# Patient Record
Sex: FEMALE | Race: ASIAN
[De-identification: names, ages, dates, MRNs, and addresses within clinical notes are randomized per-mention and may not be internally consistent; named-entity substitution may affect disease eponyms.]

---

## 2021-06-15 ENCOUNTER — HOSPITAL ENCOUNTER (OUTPATIENT)
Dept: HOSPITAL 47 - RADUSWWP | Age: 31
Discharge: HOME | End: 2021-06-15
Attending: SURGERY
Payer: COMMERCIAL

## 2021-06-15 DIAGNOSIS — N20.0: Primary | ICD-10-CM

## 2021-06-15 DIAGNOSIS — K81.1: ICD-10-CM

## 2021-06-15 PROCEDURE — 76705 ECHO EXAM OF ABDOMEN: CPT

## 2021-06-15 PROCEDURE — 78226 HEPATOBILIARY SYSTEM IMAGING: CPT

## 2021-06-15 NOTE — US
EXAMINATION TYPE: US gallbladder

 

DATE OF EXAM: 6/15/2021

 

COMPARISON: NONE

 

CLINICAL HISTORY: K81.1 Chronic cholecystitis. Patient states she gets pain after she eats.  Patient 
states weight loss.  

 

EXAM MEASUREMENTS:

 

Liver Length:  13.5 cm   

Gallbladder Wall:  0.1 cm   

CBD:  0.4 cm

Right Kidney:  9.0 x 4.2 x 5.1 cm

 

 

 

Pancreas:  wnl

Liver:  wnl  

Gallbladder:  wnl

**Evidence for sonographic Mckinney's sign:  neg

CBD:  wnl 

Right Kidney:  medial upper cystic lesion = 1.4 x 1.3 x 1.4 cm.  Upper medial echogenic focus- 0.7 cm
   

 

 

 

IMPRESSION:

Right nephrolithiasis and a 1.4cm likely right renal cyst, amenable to followup.

## 2021-06-16 ENCOUNTER — HOSPITAL ENCOUNTER (OUTPATIENT)
Dept: HOSPITAL 47 - ORWHC2ENDO | Age: 31
Discharge: HOME | End: 2021-06-16
Attending: SURGERY
Payer: COMMERCIAL

## 2021-06-16 VITALS — TEMPERATURE: 99.2 F | RESPIRATION RATE: 16 BRPM

## 2021-06-16 VITALS — SYSTOLIC BLOOD PRESSURE: 109 MMHG | HEART RATE: 59 BPM | DIASTOLIC BLOOD PRESSURE: 69 MMHG

## 2021-06-16 VITALS — BODY MASS INDEX: 21.9 KG/M2

## 2021-06-16 DIAGNOSIS — Z79.899: ICD-10-CM

## 2021-06-16 DIAGNOSIS — K21.9: ICD-10-CM

## 2021-06-16 DIAGNOSIS — Z79.3: ICD-10-CM

## 2021-06-16 DIAGNOSIS — K64.0: ICD-10-CM

## 2021-06-16 DIAGNOSIS — Z87.891: ICD-10-CM

## 2021-06-16 DIAGNOSIS — K29.50: Primary | ICD-10-CM

## 2021-06-16 DIAGNOSIS — R19.5: ICD-10-CM

## 2021-06-16 DIAGNOSIS — Z88.2: ICD-10-CM

## 2021-06-16 DIAGNOSIS — Z86.19: ICD-10-CM

## 2021-06-16 PROCEDURE — 43239 EGD BIOPSY SINGLE/MULTIPLE: CPT

## 2021-06-16 PROCEDURE — 88305 TISSUE EXAM BY PATHOLOGIST: CPT

## 2021-06-16 PROCEDURE — 45380 COLONOSCOPY AND BIOPSY: CPT

## 2021-06-16 PROCEDURE — 81025 URINE PREGNANCY TEST: CPT

## 2021-06-16 NOTE — P.PCN
Date of Procedure: 06/16/21


Description of Procedure: 








PREOPERATIVE DIAGNOSIS:


Gastritis


Gastroesophageal reflux disease.





POSTOPERATIVE DIAGNOSIS:


Gastritis.


Gastroesophageal reflux disease.





OPERATION:


Esophagogastroduodenoscopy with biopsies along antrum.





SURGEON: Laxmi Leary MD





ANESTHESIA: MAC.





INDICATIONS:


The patient is a 30-year-old female who presents with a history of gastritis.  

Benefits and risks of the procedure were described. Informed consent was 

obtained.





DESCRIPTION:


The patient was brought into the endoscopy suite and laid in the left lateral 

decubitus position. An Olympus gastroscope was passed along the posterior 

oropharynx down to the distal esophagus where the squamocolumnar junction was 

encountered at 36 cm from the incisors. The stomach was entered and no bile 

reflux was found.  Additional findings are listed below. Biopsies with cold 

forceps were obtained of the antrum. The first through third portion of the 

duodenum was examined and unremarkable. Retroflexion of the scope confirmed  

Hill grade 2 lower esophageal valve. The squamocolumnar junction demonstrated LA

grade A erosive esophagitis. The stomach was desufflated. The patient tolerated 

the procedure well.





FINDINGS:


Squamocolumnar junction 36 cm from the incisors.


Diaphragmatic hiatus at 36 cm.


Hill grade 2 lower esophageal valve.


LA grade A erosive esophagitis.


No active duodenitis.


Chronic gastritis 





RECOMMENDATIONS:


Upper endoscopy as needed.

## 2021-06-16 NOTE — P.PCN
Date of Procedure: 06/16/21


Description of Procedure: 








PREOPERATIVE DIAGNOSIS:


Change in bowel habit


History of rectal bleeding for colitis





POSTOPERATIVE DIAGNOSIS:


Change in bowel habit


History of rectal bleeding for colitis





OPERATION:


Colonoscopy to the cecum, ileocecal valve and appendiceal orifice.


Colonoscopy with random cold forceps biopsy for microscopic colitis





SURGEON: Laxmi Leary MD.





ANESTHESIA: MAC.





INDICATIONS:


The patient is a 30-year-old female who presents with rectal bleeding and 

colitis.  Benefits and risks were described and informed consent was obtained.





DESCRIPTION OF PROCEDURE:


The patient had undergone Sutab prep.  The patient had been brought into the 

operating room and laid in the left lateral decubitus position. After adequate 

intravenous sedation, the rectum was examined with 2% lidocaine jelly. No 

external hemorrhoids were encountered. The rectal tone was within normal limits.

No lesions were palpated in the rectal vault. An Olympus colonoscope was 

advanced until the cecum, ileocecal valve and appendiceal orifice were clearly 

viewed.  The prep was excellent. No scattered diverticulosis was encountered.  

No colonic polyps were found.  Random cold forceps biopsies were obtained for 

microscopic colitis.  Retroflexion of the scope demonstrated grade 1 internal 

hemorrhoids without active bleeding or inflammation. The colon was desufflated. 

The patient had tolerated the procedure well. 





Withdrawal time was over 6 minutes.





FINDINGS:


Aronchick preparation quality scale 1 (1-5)


Internal hemorrhoids, grade 1


No external prolapsed hemorrhoids.


No arteriovenous malformations.


No adenomatous polyps.


Random biopsies obtained for microscopic colitis





RECOMMENDATIONS:


Lower endoscopy as needed





Plan - Discharge Summary


Discharge Rx Participant: No


New Discharge Prescriptions: 


Continue


   Omeprazole 20 mg PO DAILY


   Birth Control Pill 1 tab PO HS


Discharge Medication List





Birth Control Pill 1 tab PO HS 06/01/21 [History]


Omeprazole 20 mg PO DAILY 06/01/21 [History]








Follow up Appointment(s)/Referral(s): 


Laxmi Leary MD [STAFF PHYSICIAN] - 06/22/21


Patient Instructions/Handouts:  *[facility] - (Anesthesia) Endoscopy Discharge 

Instructions, Colonoscopy (GEN), Upper Endoscopy (DC)


Discharge Disposition: HOME SELF-CARE

## 2021-06-16 NOTE — P.GSHP
History of Present Illness


H&P Date: 06/16/21











CHIEF COMPLAINT: GERD and gastrointestinal bleeding





HISTORY OF PRESENT ILLNESS: The patient is a 30-year-old female who


presents with gastritis and gastrointestinal bleeding .


Upper and lower endoscopy were offered for further evaluation and management.





PAST MEDICAL HISTORY: 


Please see list.





PAST SURGICAL HISTORY: 


Please see list.





MEDICATIONS: 


Please see list.





ALLERGIES:  Please see list. 





SOCIAL HISTORY: No illicit drug use





FAMILY HISTORY: No reports of Crohn disease or ulcerative colitis. 





REVIEW OF ORGAN SYSTEMS: 


CONSTITUTIONAL: No reports of fevers or chills.





PHYSICAL EXAM: 


VITAL SIGNS:  Stable


GENERAL: Well-developed pleasant in no acute distress. 


HEENT: No scleral icterus. Extraocular movements grossly


intact. Moist buccal mucosa. 


NECK: Supple without lymphadenopathy. 


CHEST: Unlabored respirations. Equal bilateral excursions. 


CARDIOVASCULAR: Regular rate and rhythm. Distal 2+ pulses. 


ABDOMEN: Soft, nondistended.  


MUSCULOSKELETAL: No clubbing, cyanosis, or edema. 





ASSESSMENT: 


1.  Gastritis


2.  Gastrointestinal bleeding





PLAN: 


1. Recommend proceeding with an upper and lower endoscopy





Past Medical History


Additional Past Medical History / Comment(s): lymes disease, mononucleosis, 

abdominal pain


History of Any Multi-Drug Resistant Organisms: None Reported


Past Surgical History: No Surgical Hx Reported


Additional Past Anesthesia/Blood Transfusion Reaction / Comment(s): has never 

had anesthesia


Smoking Status: Former smoker





- Past Family History


  ** Father


History Unknown: Yes


Additional Family Medical History / Comment(s): pt adopted unkown family hx





Medications and Allergies


                                Home Medications











 Medication  Instructions  Recorded  Confirmed  Type


 


Birth Control Pill 1 tab PO HS 06/01/21 06/14/21 History


 


Omeprazole 20 mg PO DAILY 06/01/21 06/14/21 History








                                    Allergies











Allergy/AdvReac Type Severity Reaction Status Date / Time


 


Sulfa (Sulfonamide Allergy  Nausea & Verified 06/14/21 14:13





Antibiotics)   Vomiting

## 2021-07-09 ENCOUNTER — HOSPITAL ENCOUNTER (OUTPATIENT)
Dept: HOSPITAL 47 - LABWHC1 | Age: 31
Discharge: HOME | End: 2021-07-09
Attending: FAMILY MEDICINE
Payer: COMMERCIAL

## 2021-07-09 DIAGNOSIS — E03.9: Primary | ICD-10-CM

## 2021-07-09 LAB — T4 FREE SERPL-MCNC: 1.2 NG/DL (ref 0.8–1.8)

## 2021-07-09 PROCEDURE — 84443 ASSAY THYROID STIM HORMONE: CPT

## 2021-07-09 PROCEDURE — 84480 ASSAY TRIIODOTHYRONINE (T3): CPT

## 2021-07-09 PROCEDURE — 36415 COLL VENOUS BLD VENIPUNCTURE: CPT

## 2021-07-09 PROCEDURE — 84439 ASSAY OF FREE THYROXINE: CPT
